# Patient Record
Sex: FEMALE | Race: BLACK OR AFRICAN AMERICAN
[De-identification: names, ages, dates, MRNs, and addresses within clinical notes are randomized per-mention and may not be internally consistent; named-entity substitution may affect disease eponyms.]

---

## 2021-05-09 ENCOUNTER — HOSPITAL ENCOUNTER (EMERGENCY)
Dept: HOSPITAL 61 - ER | Age: 81
Discharge: HOME | End: 2021-05-09
Payer: COMMERCIAL

## 2021-05-09 VITALS — HEIGHT: 69 IN | BODY MASS INDEX: 25.24 KG/M2 | WEIGHT: 170.42 LBS

## 2021-05-09 VITALS — DIASTOLIC BLOOD PRESSURE: 82 MMHG | SYSTOLIC BLOOD PRESSURE: 161 MMHG

## 2021-05-09 DIAGNOSIS — Z87.891: ICD-10-CM

## 2021-05-09 DIAGNOSIS — K94.23: Primary | ICD-10-CM

## 2021-05-09 DIAGNOSIS — Z86.73: ICD-10-CM
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
## 2021-05-09 DIAGNOSIS — Z88.0: ICD-10-CM
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
## 2021-05-09 PROCEDURE — 74018 RADEX ABDOMEN 1 VIEW: CPT

## 2021-05-09 PROCEDURE — 43762 RPLC GTUBE NO REVJ TRC: CPT

## 2021-05-09 PROCEDURE — 99284 EMERGENCY DEPT VISIT MOD MDM: CPT

## 2021-05-09 NOTE — PHYS DOC
Past Medical History


Past Medical History:  Arthritis, CVA


Additional Past Medical Histor:  RHEUMATOID ARTHRITIS


Past Medical History


Limited secondary to patient nonverbal at baseline


Past Surgical History:  Tonsillectomy


Additional Past Surgical Histo:  Gtube


Past Surgical History


Limited secondary to patient nonverbal at baseline


Smoking Status:  Former Smoker


Alcohol Use:  None


Drug Use:  None


Social History


Limited secondary to patient nonverbal at baseline





General Adult


EDM:


Chief Complaint:  Gtube dislodgment





HPI:


HPI:





Patient is a 81 year old female on hospice from nursing home presents via EMS 

with report of G-tube displacement this evening.  Patient presents to the 

emergency department for replacement.  Patient nonverbal s/p acute CVA 2 months 

ago at the [facility].





History of present illness limited secondary to patient nonverbal at baseline





Review of Systems:


Review of Systems:





Review of systems limited secondary to patient nonverbal at baseline





Heart Score:


C/O Chest Pain:  N/A





Allergies:


Allergies:





Allergies








Coded Allergies Type Severity Reaction Last Updated Verified


 


  Penicillins Allergy Unknown Anxiety 8/6/14 Yes











Physical Exam:


PE:





Constitutional: Well developed, well nourished, no acute distress


HENT: Normocephalic, atraumatic


Eyes: Conjunctiva normal, no discharge


Neck: Normal range of motion, supple


Lungs & Thorax:  No respiratory distress, equal chest rise and fall


Abdomen: Soft, no tenderness, gtube site without discharge or bleeding, 

dislodged tube brought by EMS an 18french Gtube


: [place]oria cath noted


Skin: Warm, dry, no erythema, no rash


Extremities: No tenderness, ROM intact, no edema


Neurologic: Alert but not oriented, nonverbal, will follow commands


Psychologic: Limited, judgment abnormal





EKG:


EKG:


[]





Radiology/Procedures:


Radiology/Procedures:


PROCEDURE: KUB





EXAM: ABDOMEN ONE VIEW.





HISTORY: Gastrostomy placement.





COMPARISON: None.





FINDINGS: A frontal view of the abdomen is obtained after injection of water-

soluble contrast through the gastrostomy tube. Contrast is intraluminal within 

the stomach. A ventriculoperitoneal shunt catheter is noted on the left.





There are no distended small bowel loops. There is gas distally. Stool 

throughout the colon is consistent with constipation.  There are moderate 

degenerative changes of the lower lumbar spine.





IMPRESSION:


1. The gastrostomy catheter is intraluminal.


2. Correlate for constipation.





Electronically signed by: PARI Aguero MD (5/9/2021 4:23 AM) [facility]





Course & Med Decision Making:


Course & Med Decision Making


Pertinent Imaging studies reviewed. (See chart for details)





Patient presents via EMS from nursing home hospice with report of G-tube 

displacement.  Patient presents to the ER for replacement.  G-tube 18 Liberian 

successfully replaced.  Confirmatory x-ray obtained.





Patient stable for discharge back to nursing home hospice with outpatient 

follow-up with PCP.





Nelli Disclaimer:


Dragon Disclaimer:


This electronic medical record was generated, in whole or in part, using a voice

 recognition dictation system.





Additional Procedures


Progress


G-tube replacement





Time out performed. Hand hygiene utilized. Wound cleaned with ChloraPrep.  

Placement of 18-Liberian G-tube placed with some difficulty due to contraction of 

site. Balloon inflated with 20 cc sterile saline.  Buttress adjusted.  Able to 

remove some gastric contents with tumi syringe and easily flushes. Confirmatory 

x-ray obtained with good placement. Patient tolerated procedure well.





Departure


Departure


Impression:  


   Primary Impression:  


   Encounter for feeding tube placement


Disposition:  01 HOME / SELF CARE / HOMELESS (Back to nursing home)


Condition:  STABLE


Referrals:  


MERCEDES HERRON MD


Patient Instructions:  Gastric Tube Replacement











LAUREL SALAS DO              May 9, 2021 02:04

## 2021-05-09 NOTE — RAD
EXAM: ABDOMEN ONE VIEW.



HISTORY: Gastrostomy placement.



COMPARISON: None.



FINDINGS: A frontal view of the abdomen is obtained after injection of water-soluble contrast through
 the gastrostomy tube. Contrast is intraluminal within the stomach. A ventriculoperitoneal shunt cath
eter is noted on the left.



There are no distended small bowel loops. There is gas distally. Stool throughout the colon is consis
tent with constipation.  There are moderate degenerative changes of the lower lumbar spine.



IMPRESSION:

1. The gastrostomy catheter is intraluminal.

2. Correlate for constipation.



Electronically signed by: PARI Aguero MD (5/9/2021 4:23 AM) [facility]